# Patient Record
Sex: MALE | Race: OTHER | HISPANIC OR LATINO
[De-identification: names, ages, dates, MRNs, and addresses within clinical notes are randomized per-mention and may not be internally consistent; named-entity substitution may affect disease eponyms.]

---

## 2024-06-18 ENCOUNTER — APPOINTMENT (OUTPATIENT)
Dept: ORTHOPEDIC SURGERY | Facility: CLINIC | Age: 44
End: 2024-06-18
Payer: OTHER MISCELLANEOUS

## 2024-06-18 VITALS — BODY MASS INDEX: 26.66 KG/M2 | WEIGHT: 180 LBS | HEIGHT: 69 IN

## 2024-06-18 DIAGNOSIS — Z78.9 OTHER SPECIFIED HEALTH STATUS: ICD-10-CM

## 2024-06-18 DIAGNOSIS — S46.012A STRAIN OF MUSCLE(S) AND TENDON(S) OF THE ROTATOR CUFF OF LEFT SHOULDER, INITIAL ENCOUNTER: ICD-10-CM

## 2024-06-18 DIAGNOSIS — M54.12 RADICULOPATHY, CERVICAL REGION: ICD-10-CM

## 2024-06-18 PROBLEM — Z00.00 ENCOUNTER FOR PREVENTIVE HEALTH EXAMINATION: Status: ACTIVE | Noted: 2024-06-18

## 2024-06-18 PROCEDURE — 99204 OFFICE O/P NEW MOD 45 MIN: CPT

## 2024-06-18 PROCEDURE — 72040 X-RAY EXAM NECK SPINE 2-3 VW: CPT

## 2024-06-18 PROCEDURE — 73030 X-RAY EXAM OF SHOULDER: CPT | Mod: LT

## 2024-06-18 RX ORDER — METHYLPREDNISOLONE 4 MG/1
4 TABLET ORAL
Qty: 1 | Refills: 0 | Status: ACTIVE | COMMUNITY
Start: 2024-06-18 | End: 1900-01-01

## 2024-06-18 NOTE — WORK
[Sprain/Strain] : sprain/strain [Was the competent medical cause of the injury] : was the competent medical cause of the injury [Are consistent with the injury] : are consistent with the injury [Consistent with my objective findings] : consistent with my objective findings [Total (100%)] : total (100%) [Does not reveal pre-existing condition(s) that may affect treatment/prognosis] : does not reveal pre-existing condition(s) that may affect treatment/prognosis

## 2024-06-25 ENCOUNTER — APPOINTMENT (OUTPATIENT)
Dept: ORTHOPEDIC SURGERY | Facility: CLINIC | Age: 44
End: 2024-06-25
Payer: OTHER MISCELLANEOUS

## 2024-06-25 VITALS — WEIGHT: 180 LBS | HEIGHT: 69 IN | BODY MASS INDEX: 26.66 KG/M2

## 2024-06-25 PROCEDURE — 99214 OFFICE O/P EST MOD 30 MIN: CPT

## 2024-06-25 RX ORDER — MELOXICAM 15 MG/1
15 TABLET ORAL
Qty: 30 | Refills: 0 | Status: ACTIVE | COMMUNITY
Start: 2024-06-25 | End: 1900-01-01

## 2024-06-27 NOTE — DISCUSSION/SUMMARY
[Medication Risks Reviewed] : Medication risks reviewed [Surgical risks reviewed] : Surgical risks reviewed [de-identified] : WC: 6/10/24-  currently not working  The patient's condition is acute Confounding medical conditions/concerns: Tests/Studies Independently Interpreted Today :    xray of the L shoulder revealed evidence of big bone spur with no evidence of fx                                                                         xray of the c-spine revealed evidence of straightening and loss of normal lordosis consistent with spasm  ------------------------------------------------------------------------------------------------------------------   was present for office visit Unclear if the pain is radicular from the neck rec further imaging to eval    Due to worsening pain and instability with mechanical symptoms, recommend the patient obtain MRI  L shoulder to rule out cuff tear . Follow up after MRI to possibly rule out surgical pathology and discuss future treatment options.    Due to worsening pain and instability with mechanical symptoms, recommend the patient obtain MRI c-spine  to rule out HNP. Follow up after MRI to possibly rule out surgical pathology and discuss future treatment options.    Prescribed patient MDP. Use as directed for the next five days.  Following which OTC NSAIDs may be used PRN for pain, inflammation, and discomfort.  No NSAID medications should be taken concurrently with the Medrol Dose Pack.  appears that even though describes pains shoulder , pain may be referred from neck an dneck was part of original injruy with referred sabi castillo Discussed trying a CSI injection but pt has deferred and would like to obtain MRIs first f/u in 1 week    I, Danna Perkins, attest that this documentation has been prepared under the direction and in the presence of Provider Dr. Gabriel Serrano

## 2024-06-27 NOTE — PHYSICAL EXAM
[Left] : left shoulder [] : no atrophy [FreeTextEntry9] : no external or internal rotation [de-identified] : presents in sling [FreeTextEntry1] : xray of the L shoulder revealed evidence of big bone spurs with no evidence of fx  [TWNoteComboBox7] : active forward flexion 90 degrees [TWNoteComboBox4] : False

## 2024-06-27 NOTE — HISTORY OF PRESENT ILLNESS
[de-identified] : Patient is here for left shoulder injury that occurred on 6/10/24 while at work; . Was pushing a hand truck; felt an immediate pain. Went to [facility], got XR - negative for fracture. Wearing sling. Denies n/t. Pain is located in shoulder blade, radiates into cervical. Worsens lifting. No prior injury. Notes no improvement. Tried Ibuprofen. Currently not working. WC DOI: 6/10/24. Referred by Gaetano.

## 2024-07-29 ENCOUNTER — APPOINTMENT (OUTPATIENT)
Dept: ORTHOPEDIC SURGERY | Facility: CLINIC | Age: 44
End: 2024-07-29
Payer: OTHER MISCELLANEOUS

## 2024-07-29 DIAGNOSIS — S46.012A STRAIN OF MUSCLE(S) AND TENDON(S) OF THE ROTATOR CUFF OF LEFT SHOULDER, INITIAL ENCOUNTER: ICD-10-CM

## 2024-07-29 DIAGNOSIS — M54.12 RADICULOPATHY, CERVICAL REGION: ICD-10-CM

## 2024-07-29 PROCEDURE — 99214 OFFICE O/P EST MOD 30 MIN: CPT

## 2024-08-04 NOTE — DISCUSSION/SUMMARY
[Medication Risks Reviewed] : Medication risks reviewed [Surgical risks reviewed] : Surgical risks reviewed [de-identified] : WC DOI: 6/10/24- currently working The patient's condition is acute Confounding medical conditions/concerns: NA Tests/Studies Independently Interpreted Today: MRI of the c-spine revealed mild degenerative disc and facet arthropathic changes throughout the c-spine MRI of the L shoulder revealed a small partial thickness tear of the supraspinatus tendon, mild subscapularis tendinosis and Ac arthrosis  ------------------------------------------------------------------------------------------------------------------   We reviewed the mri findings and discussed treatment options, both operative and non operative for both the pt neck and shoulder . Discussed risks of potential surgery. However, due to the risks of the surgery, we will try NSAIDs and therapy. Discussed management of medication.  Pt has noted improvement so would rec continue conservative management of the shoulder.  Plan for PT L shoulder tendonitis and cerviclagia   Discussed appropriate use of OTC anti-inflammatories and topical analgesics (including but not limited to Aleve, Advil, Tylenol, Motrin, Ibuprofen, Voltaren gel, etc.)  Prescribed patient Meloxicam 15mg daily and discussed risks of side effects, as well as timing/management of medication.  Side effects can include but are not limited to gastrointestinal ulcers and irritation, kidney failure, and bleeding issues. Use as directed and take with food to manage pain, inflammation, and discomfort. f/u in 4 weeks if pain persist    I, Danna Perkins, attest that this documentation has been prepared under the direction and in the presence of Provider Dr. Gabriel Serrano

## 2024-08-04 NOTE — HISTORY OF PRESENT ILLNESS
[de-identified] : Pt is here to follow up on the LEFT SHOULDER and C SPINE. Pt states the pain level has decreased since last visit. Pt has not started therapy yet  he is feeling alot better

## 2024-08-04 NOTE — PHYSICAL EXAM
[Left] : left shoulder [] : no atrophy [FreeTextEntry9] : no external or internal rotation [TWNoteComboBox7] : active forward flexion 90 degrees

## 2024-08-04 NOTE — HISTORY OF PRESENT ILLNESS
[de-identified] : Pt is here to follow up on the LEFT SHOULDER and C SPINE. Pt states the pain level has decreased since last visit. Pt has not started therapy yet  he is feeling alot better

## 2024-08-04 NOTE — DATA REVIEWED
[Cervical Spine] : cervical spine [MRI] : MRI [Left] : left [Shoulder] : shoulder [Report was reviewed and noted in the chart] : The report was reviewed and noted in the chart [I independently reviewed and interpreted images and report] : I independently reviewed and interpreted images and report [I reviewed the films/CD] : I reviewed the films/CD [FreeTextEntry1] : MRI of the c-spine revealed mild degenerative disc and facet arthropathic changes throughout the c-spine [FreeTextEntry2] :  MRI of the L shoulder revealed a small partial thickness tear of the supraspinatus tendon, mild subscapularis tendinosis and Ac arthrosis

## 2024-08-04 NOTE — DISCUSSION/SUMMARY
[Medication Risks Reviewed] : Medication risks reviewed [Surgical risks reviewed] : Surgical risks reviewed [de-identified] : WC DOI: 6/10/24- currently working The patient's condition is acute Confounding medical conditions/concerns: NA Tests/Studies Independently Interpreted Today: MRI of the c-spine revealed mild degenerative disc and facet arthropathic changes throughout the c-spine MRI of the L shoulder revealed a small partial thickness tear of the supraspinatus tendon, mild subscapularis tendinosis and Ac arthrosis  ------------------------------------------------------------------------------------------------------------------   We reviewed the mri findings and discussed treatment options, both operative and non operative for both the pt neck and shoulder . Discussed risks of potential surgery. However, due to the risks of the surgery, we will try NSAIDs and therapy. Discussed management of medication.  Pt has noted improvement so would rec continue conservative management of the shoulder.  Plan for PT L shoulder tendonitis and cerviclagia   Discussed appropriate use of OTC anti-inflammatories and topical analgesics (including but not limited to Aleve, Advil, Tylenol, Motrin, Ibuprofen, Voltaren gel, etc.)  Prescribed patient Meloxicam 15mg daily and discussed risks of side effects, as well as timing/management of medication.  Side effects can include but are not limited to gastrointestinal ulcers and irritation, kidney failure, and bleeding issues. Use as directed and take with food to manage pain, inflammation, and discomfort. f/u in 4 weeks if pain persist    I, Danna Perkins, attest that this documentation has been prepared under the direction and in the presence of Provider Dr. Gabriel Serrano

## 2024-08-04 NOTE — DISCUSSION/SUMMARY
[Medication Risks Reviewed] : Medication risks reviewed [Surgical risks reviewed] : Surgical risks reviewed [de-identified] : WC DOI: 6/10/24- currently working The patient's condition is acute Confounding medical conditions/concerns: NA Tests/Studies Independently Interpreted Today: MRI of the c-spine revealed mild degenerative disc and facet arthropathic changes throughout the c-spine MRI of the L shoulder revealed a small partial thickness tear of the supraspinatus tendon, mild subscapularis tendinosis and Ac arthrosis  ------------------------------------------------------------------------------------------------------------------   We reviewed the mri findings and discussed treatment options, both operative and non operative for both the pt neck and shoulder . Discussed risks of potential surgery. However, due to the risks of the surgery, we will try NSAIDs and therapy. Discussed management of medication.  Pt has noted improvement so would rec continue conservative management of the shoulder.  Plan for PT L shoulder tendonitis and cerviclagia   Discussed appropriate use of OTC anti-inflammatories and topical analgesics (including but not limited to Aleve, Advil, Tylenol, Motrin, Ibuprofen, Voltaren gel, etc.)  Prescribed patient Meloxicam 15mg daily and discussed risks of side effects, as well as timing/management of medication.  Side effects can include but are not limited to gastrointestinal ulcers and irritation, kidney failure, and bleeding issues. Use as directed and take with food to manage pain, inflammation, and discomfort. f/u in 4 weeks if pain persist    I, Danna Perkins, attest that this documentation has been prepared under the direction and in the presence of Provider Dr. Gabriel Serrano

## 2024-08-04 NOTE — HISTORY OF PRESENT ILLNESS
[de-identified] : Pt is here to follow up on the LEFT SHOULDER and C SPINE. Pt states the pain level has decreased since last visit. Pt has not started therapy yet  he is feeling alot better

## 2024-08-26 ENCOUNTER — APPOINTMENT (OUTPATIENT)
Dept: ORTHOPEDIC SURGERY | Facility: CLINIC | Age: 44
End: 2024-08-26